# Patient Record
Sex: MALE | Race: WHITE
[De-identification: names, ages, dates, MRNs, and addresses within clinical notes are randomized per-mention and may not be internally consistent; named-entity substitution may affect disease eponyms.]

---

## 2017-08-02 ENCOUNTER — HOSPITAL ENCOUNTER (EMERGENCY)
Dept: HOSPITAL 25 - UCCORT | Age: 37
Discharge: HOME | End: 2017-08-02
Payer: COMMERCIAL

## 2017-08-02 VITALS — DIASTOLIC BLOOD PRESSURE: 73 MMHG | SYSTOLIC BLOOD PRESSURE: 128 MMHG

## 2017-08-02 DIAGNOSIS — Z88.0: ICD-10-CM

## 2017-08-02 DIAGNOSIS — H92.02: Primary | ICD-10-CM

## 2017-08-02 DIAGNOSIS — H61.23: ICD-10-CM

## 2017-08-02 PROCEDURE — 99213 OFFICE O/P EST LOW 20 MIN: CPT

## 2017-08-02 PROCEDURE — G0463 HOSPITAL OUTPT CLINIC VISIT: HCPCS

## 2017-08-02 NOTE — UC
Ear Complaint HPI





- HPI Summary


HPI Summary: 





pt presents with c/o left ear discomfort, fullness, occasional dizziness and 

"popping".  Pt has history of cerumen impaction denies fever or chills, nasal 

congestion or sinus pressure. 





- History of Current Complaint


Chief Complaint: UCEar


Stated Complaint: LEFT EAR COMPLAINT


Time Seen by Provider: 08/02/17 10:15


Hx Obtained From: Patient


Onset/Duration: Gradual Onset, Lasting Weeks, Still Present


Severity Initially: Mild


Severity Currently: Mild


Associated Signs/Symptoms: Positive: Foreign Body Sensation





- Allergies/Home Medications


Allergies/Adverse Reactions: 


 Allergies











Allergy/AdvReac Type Severity Reaction Status Date / Time


 


Penicillins Allergy Intermediate Rash Verified 08/02/17 10:10














PMH/Surg Hx/FS Hx/Imm Hx


Previously Healthy: Yes





- Surgical History


Surgical History: None





- Family History


Known Family History: Positive: Cardiac Disease





- Social History


Occupation: Employed Full-time


Lives: With Family


Alcohol Use: Occasionally


Substance Use Type: None


Smoking Status (MU): Never Smoked Tobacco


Have You Smoked in the Last Year: No





- Immunization History


Most Recent Influenza Vaccination: 10/13





Review of Systems


Constitutional: Negative


Skin: Negative


Eyes: Negative


ENT: Ear Ache - left


Respiratory: Negative


Cardiovascular: Negative


Gastrointestinal: Negative


Genitourinary: Negative


Motor: Negative


Neurovascular: Negative


Musculoskeletal: Negative


Neurological: Negative


Psychological: Negative


All Other Systems Reviewed And Are Negative: Yes





Physical Exam


Triage Information Reviewed: Yes


Appearance: Well-Appearing


Vital Signs: 


 Initial Vital Signs











Temp  98.6 F   08/02/17 10:10


 


Pulse  64   08/02/17 10:10


 


Resp  18   08/02/17 10:10


 


BP  128/73   08/02/17 10:10


 


Pulse Ox  99   08/02/17 10:10











Vital Signs Reviewed: Yes


Eye Exam: Normal


ENT Exam: Other


ENT: Positive: TMs normal, Other: - cerumen in bilateral ears, TM visible


Dental Exam: Normal


Neck exam: Normal


Respiratory Exam: Normal


Cardiovascular Exam: Normal


Musculoskeletal Exam: Normal


Neurological Exam: Normal


Psychological Exam: Normal


Skin Exam: Normal





Ear Complaint Course/Dx





- Differential Dx/Diagnosis


Differential Diagnosis/HQI/PQRI: Cerumen Impaction, Other - ear ache


Provider Diagnoses: ear ache left ear.  cerumen bilateral ears





Discharge





- Discharge Plan


Condition: Stable


Disposition: HOME


Patient Education Materials:  Earache (ED)


Referrals: 


Brown Bowens MD [Primary Care Provider] - If Needed
none

## 2017-10-14 ENCOUNTER — HOSPITAL ENCOUNTER (EMERGENCY)
Dept: HOSPITAL 25 - UCCORT | Age: 37
Discharge: HOME | End: 2017-10-14
Payer: COMMERCIAL

## 2017-10-14 VITALS — DIASTOLIC BLOOD PRESSURE: 67 MMHG | SYSTOLIC BLOOD PRESSURE: 124 MMHG
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
## 2017-10-14 DIAGNOSIS — S81.811A: Primary | ICD-10-CM

## 2017-10-14 DIAGNOSIS — Z23: ICD-10-CM
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
## 2017-10-14 DIAGNOSIS — Z88.0: ICD-10-CM

## 2017-10-14 PROCEDURE — 90715 TDAP VACCINE 7 YRS/> IM: CPT

## 2017-10-14 PROCEDURE — 90471 IMMUNIZATION ADMIN: CPT

## 2017-10-14 PROCEDURE — 99212 OFFICE O/P EST SF 10 MIN: CPT

## 2017-10-14 PROCEDURE — 12002 RPR S/N/AX/GEN/TRNK2.6-7.5CM: CPT

## 2017-10-14 PROCEDURE — G0463 HOSPITAL OUTPT CLINIC VISIT: HCPCS

## 2017-10-14 NOTE — UC
Laceration HPI





- HPI Summary


HPI Summary: 





patient has a 4 cm lac to the right shin, slipped an hit the shin on a wood box

, unknown tetanus date. bleeding is controlled.





- History Of Current Complaint


Chief Complaint: UCLaceration


Stated Complaint: RIGHT SHIN LACERATION


Time Seen by Provider: 10/14/17 18:38


Hx Obtained From: Patient


Mechanism Of Injury: Blunt Trauma


Onset/Duration: Sudden Onset, Lasting Hours


Severity: Mild





- Allergies/Home Medications


Allergies/Adverse Reactions: 


 Allergies











Allergy/AdvReac Type Severity Reaction Status Date / Time


 


Penicillins Allergy Intermediate Rash Verified 10/14/17 18:00














PMH/Surg Hx/FS Hx/Imm Hx


Previously Healthy: Yes





- Surgical History


Surgical History: None





- Family History


Known Family History: Positive: Cardiac Disease





- Social History


Alcohol Use: Occasionally


Substance Use Type: None


Smoking Status (MU): Never Smoked Tobacco


Have You Smoked in the Last Year: No





- Immunization History


Most Recent Influenza Vaccination: no





Review of Systems


Constitutional: Negative


Skin: Other - laceration


ENT: Negative


Respiratory: Negative


Cardiovascular: Negative


Gastrointestinal: Negative


Genitourinary: Negative


Motor: Negative


Neurovascular: Negative


Musculoskeletal: Negative


Neurological: Negative


Psychological: Negative


Is Patient Immunocompromised?: No


All Other Systems Reviewed And Are Negative: Yes





Physical Exam


Triage Information Reviewed: Yes


Appearance: Well-Appearing, Well-Nourished, Pain Distress


Vital Signs: 


 Initial Vital Signs











Temp  98 F   10/14/17 17:54


 


Pulse  71   10/14/17 17:54


 


Resp  14   10/14/17 17:54


 


BP  124/67   10/14/17 17:54


 


Pulse Ox  100   10/14/17 17:54











Vital Signs Reviewed: Yes


Eye Exam: Normal


ENT Exam: Normal


Dental Exam: Normal


Neck exam: Normal


Respiratory Exam: Normal


Cardiovascular Exam: Normal


Cardiovascular: Positive: RRR, No Murmur, Pulses Normal


Abdominal Exam: Normal


Abdomen Description: Positive: Nontender, No Organomegaly, Soft


Bowel Sounds: Positive: Present


Musculoskeletal Exam: Normal


Musculoskeletal: Positive: Strength Intact, ROM Intact, No Edema


Neurological Exam: Normal


Neurological: Positive: Alert, Muscle Tone Normal


Skin: Positive: Other - laceration to the right lower leg, 4 cm,





Laceration Repair





- Laceration Repair


  ** 1


Description: Linear


Laceration Size After Repair: Length (cm) - 4


Modified For Repair: No


Type Injection: Local


Anesthesia Used: 1.0% Lido


Cleansing Completed Via Routine Prep: Yes


Irrigation With Pressure Irrigation Device: Yes


Closure Material: Sutures - 5


Closure Method: Single Layer


Suture Of: Skin


Suture Type: Nylon





Laceration Course/Dx





- Course/Dx


Course Of Treatment: hx obtained, exam performed ,meds reviewed, laceration 

cleansed, repaired and dressed, tetanus given





- Differential Dx - Laceration/Wound


Differental Diagnoses: Laceration


Provider Diagnoses: laceration, simple greater than 2.5 cm of right lower leg





Discharge





- Discharge Plan


Condition: Stable


Disposition: HOME


Prescriptions: 


Cephalexin CAP* [Keflex CAP*] 500 mg PO BID #14 cap


Patient Education Materials:  Laceration (ED)


Referrals: 


Brown Bowens MD [Primary Care Provider] - 


Additional Instructions: 


1. take the medication as prescribed.


2. Keep area clean and dry


3. cover and protect with non stick pad, do not use neosporin,


4. Remove in 7-10 days you can return her or go to the Primary  physician

## 2018-12-19 ENCOUNTER — HOSPITAL ENCOUNTER (EMERGENCY)
Dept: HOSPITAL 25 - UCCORT | Age: 38
Discharge: HOME | End: 2018-12-19
Payer: COMMERCIAL

## 2018-12-19 VITALS — DIASTOLIC BLOOD PRESSURE: 73 MMHG | SYSTOLIC BLOOD PRESSURE: 125 MMHG

## 2018-12-19 DIAGNOSIS — J40: Primary | ICD-10-CM

## 2018-12-19 PROCEDURE — 99212 OFFICE O/P EST SF 10 MIN: CPT

## 2018-12-19 PROCEDURE — G0463 HOSPITAL OUTPT CLINIC VISIT: HCPCS

## 2018-12-19 NOTE — UC
Respiratory Complaint HPI





- HPI Summary


HPI Summary: 


38-year-old male comes to clinic with a chief complaint of upper respiratory 

tract infection symptoms for 3 weeks.He started out with runny nose sore throat 

and cough.  Over the last several days is feeling like it's gone into his 

chest.  It's making him somewhat short of breath.  Having a hard time bringing 

up any sputum.  When he is taking a shower with steam the phlegm does loosen up 

and that helps him breathe better.  No recent fevers.  No wheezing does not 

have a history of asthma.





- History of Current Complaint


Chief Complaint: UCRespiratory


Stated Complaint: COUGH, CONGESTION


Time Seen by Provider: 12/19/18 11:17


Pain Intensity: 0





- Allergies/Home Medications


Allergies/Adverse Reactions: 


 Allergies











Allergy/AdvReac Type Severity Reaction Status Date / Time


 


Penicillins Allergy  Rash Verified 12/19/18 11:10











Home Medications: 


 Home Medications





Decongestant Med PRN 12/19/18 [History]











PMH/Surg Hx/FS Hx/Imm Hx


Previously Healthy: Yes





- Surgical History


Surgical History: None





- Family History


Known Family History: Positive: Cardiac Disease





- Social History


Alcohol Use: Occasionally


Substance Use Type: None


Smoking Status (MU): Never Smoked Tobacco


Have You Smoked in the Last Year: No





- Immunization History


Most Recent Influenza Vaccination: no





Review of Systems


All Other Systems Reviewed And Are Negative: Yes


Constitutional: Positive: Negative


Skin: Positive: Negative


Eyes: Positive: Negative


ENT: Positive: Sore Throat, Nasal Discharge


Respiratory: Positive: Shortness Of Breath, Cough


Cardiovascular: Positive: Negative


Gastrointestinal: Positive: Negative


Motor: Positive: Negative


Neurovascular: Positive: Negative


Musculoskeletal: Positive: Negative


Neurological: Positive: Negative


Psychological: Positive: Negative


Is Patient Immunocompromised?: No





Physical Exam


Triage Information Reviewed: Yes


Appearance: No Pain Distress, Well-Nourished, Ill-Appearing - mild


Vital Signs: 


 Initial Vital Signs











Temp  97.6 F   12/19/18 11:13


 


Pulse  55   12/19/18 11:13


 


Resp  16   12/19/18 11:13


 


BP  125/73   12/19/18 11:13


 


Pulse Ox  100   12/19/18 11:13











Vital Signs Reviewed: Yes


Eye Exam: Normal


Eyes: Positive: Conjunctiva Clear


ENT: Positive: Pharyngeal erythema, Nasal congestion, TMs normal


Neck exam: Normal


Neck: Positive: Supple


Respiratory: Positive: Lungs clear, Normal breath sounds, No respiratory 

distress


Cardiovascular: Positive: RRR


Musculoskeletal Exam: Normal


Musculoskeletal: Positive: Strength Intact, ROM Intact


Neurological Exam: Normal


Neurological: Positive: Alert, Muscle Tone Normal


Psychological Exam: Normal


Psychological: Positive: Age Appropriate Behavior


Skin Exam: Normal





UC Diagnostic Evaluation





- Laboratory


O2 Sat by Pulse Oximetry: 100





Respiratory Course/Dx





- Course


Course Of Treatment: No consolidation on lung exam today to suggest pneumonia.  

Symptoms been going on for 3 weeks therefore we'll treat with Ace azithromycin.





- Differential Dx/Diagnosis


Provider Diagnosis: 


 Bronchitis








Discharge





- Sign-Out/Discharge


Documenting (check all that apply): Patient Departure


All imaging exams completed and their final reports reviewed: No Studies





- Discharge Plan


Condition: Stable


Disposition: HOME


Prescriptions: 


Azithromyxin ZHANNA (NF) [Z-Zhanna (Zithromax) 250 mg tabs #6] 2 tab PO .TODAY, THEN 

1 DAILY #6 tab


Patient Education Materials:  Acute Bronchitis (ED)


Referrals: 


Brown Bowens MD [Primary Care Provider] - 


Additional Instructions: 


FOLLOW UP WITH YOUR DOCTOR IF NOT COMPLETELY IMPROVED.


GET RECHECKED FOR ANY WORSENING OF YOUR CONDITION OR QUESTIONS OR CONCERNS.





- Billing Disposition and Condition


Condition: STABLE


Disposition: Home

## 2019-02-21 ENCOUNTER — HOSPITAL ENCOUNTER (EMERGENCY)
Dept: HOSPITAL 25 - UCCORT | Age: 39
Discharge: HOME | End: 2019-02-21
Payer: COMMERCIAL

## 2019-02-21 VITALS — DIASTOLIC BLOOD PRESSURE: 78 MMHG | SYSTOLIC BLOOD PRESSURE: 153 MMHG

## 2019-02-21 DIAGNOSIS — H81.12: Primary | ICD-10-CM

## 2019-02-21 DIAGNOSIS — Z88.0: ICD-10-CM

## 2019-02-21 PROCEDURE — 99212 OFFICE O/P EST SF 10 MIN: CPT

## 2019-02-21 PROCEDURE — G0463 HOSPITAL OUTPT CLINIC VISIT: HCPCS

## 2019-02-21 NOTE — ED
Throat Pain/Nasal Congestion





- HPI Summary


HPI Summary: 





hx. of intermittent dizzyness, pressure in the left ear, dizzyness associated 

with change in position. no hx. of tinnitus, no change in hearing. This has 

occurred off and on for the last several years. 





- History of Current Complaint


Chief Complaint: UCEar


Time Seen by Provider: 02/21/19 15:09


Hx Obtained From: Patient


Onset/Duration: Gradual Onset


Severity: Moderate


Associated Signs And Symptoms: Positive: Negative





- Allergies/Home Medications


Allergies/Adverse Reactions: 


 Allergies











Allergy/AdvReac Type Severity Reaction Status Date / Time


 


Penicillins Allergy  Rash Verified 02/21/19 15:02














PMH/Surg Hx/FS Hx/Imm Hx


Previously Healthy: Yes


Endocrine/Hematology History: 


   Denies: Hx Diabetes, Hx Thyroid Disease


Cardiovascular History: 


   Denies: Hx Hypertension, Hx Pacemaker/ICD


Respiratory History: 


   Denies: Hx Asthma, Hx Lung Cancer


Musculoskeletal History: 


   Denies: Hx Rheumatoid Arthritis, Hx Osteoporosis


Sensory History: 


   Denies: Hx Hearing Aid


Psychiatric History: 


   Denies: Hx Panic Disorder


Infectious Disease History: No


Infectious Disease History: 


   Denies: Traveled Outside the [place] in Last 30 Days





- Family History


Known Family History: Positive: Cardiac Disease





- Social History


Alcohol Use: Occasionally


Substance Use Type: Reports: None


Smoking Status (MU): Never Smoked Tobacco


Have You Smoked in the Last Year: No





Review of Systems


Constitutional: Negative


Eyes: Negative


ENT: Other


Positive: Other - pressure in left ear, and sense of dizzyness 


Cardiovascular: Negative


Respiratory: Negative


Gastrointestinal: Negative


Genitourinary: Negative


Musculoskeletal: Negative


All Other Systems Reviewed And Are Negative: Yes





Physical Exam


Triage Information Reviewed: Yes


Vital Signs On Initial Exam: 


 Initial Vitals











Temp Pulse Resp BP Pulse Ox


 


 36.6 C   74   16   153/78   100 


 


 02/21/19 15:03  02/21/19 15:03  02/21/19 15:03  02/21/19 15:03  02/21/19 15:03











Vital Signs Reviewed: Yes


Appearance: Positive: Well-Appearing


Skin: Positive: Warm, Dry


Head/Face: Positive: Normal Head/Face Inspection


Eyes: Positive: Normal, Other: - negative nystagmus


ENT: Positive: Pharynx normal, TMs normal - on left, right with cerumin, Uvula 

midline


Respiratory/Lung Sounds: Positive: Clear to Auscultation


Cardiovascular: Positive: Normal


Abdomen Description: Positive: Nontender


Bowel Sounds: Positive: Present





Diagnostics





- Vital Signs


 Vital Signs











  Temp Pulse Resp BP Pulse Ox


 


 02/21/19 15:03  36.6 C  74  16  153/78  100














- Laboratory


Lab Statement: Any lab studies that have been ordered have been reviewed, and 

results considered in the medical decision making process.





EENT Course/Dx





- Diagnoses


Provider Diagnoses: 


 Benign paroxysmal positional vertigo








Discharge





- Sign-Out/Discharge


Documenting (check all that apply): Patient Departure


All imaging exams completed and their final reports reviewed: No Studies





- Discharge Plan


Condition: Good


Disposition: HOME


Prescriptions: 


Fluticasone NASAL SPRAY 50MCG* [Flonase NASAL SPRAY 50MCG*] 2 spray BOTH NARES 

DAILY #1 btl


Patient Education Materials:  Dizziness (ED), Meniere Disease (ED)


Referrals: 


Brown Bowens MD [Primary Care Provider] - 


Roel Valenzuela MD [Medical Doctor] - 





- Billing Disposition and Condition


Condition: GOOD


Disposition: Home